# Patient Record
Sex: FEMALE | Race: WHITE | NOT HISPANIC OR LATINO | URBAN - METROPOLITAN AREA
[De-identification: names, ages, dates, MRNs, and addresses within clinical notes are randomized per-mention and may not be internally consistent; named-entity substitution may affect disease eponyms.]

---

## 2024-02-09 ENCOUNTER — OFFICE (OUTPATIENT)
Dept: URBAN - METROPOLITAN AREA CLINIC 16 | Facility: CLINIC | Age: 89
End: 2024-02-09
Payer: COMMERCIAL

## 2024-02-09 VITALS
HEART RATE: 71 BPM | HEIGHT: 60 IN | OXYGEN SATURATION: 96 % | WEIGHT: 133 LBS | SYSTOLIC BLOOD PRESSURE: 166 MMHG | DIASTOLIC BLOOD PRESSURE: 68 MMHG

## 2024-02-09 DIAGNOSIS — N18.30 CHRONIC KIDNEY DISEASE, STAGE 3 UNSPECIFIED: ICD-10-CM

## 2024-02-09 DIAGNOSIS — K52.831 COLLAGENOUS COLITIS: ICD-10-CM

## 2024-02-09 DIAGNOSIS — K21.9 GASTRO-ESOPHAGEAL REFLUX DISEASE WITHOUT ESOPHAGITIS: ICD-10-CM

## 2024-02-09 DIAGNOSIS — I49.9 CARDIAC ARRHYTHMIA, UNSPECIFIED: ICD-10-CM

## 2024-02-09 DIAGNOSIS — K58.0 IRRITABLE BOWEL SYNDROME WITH DIARRHEA: ICD-10-CM

## 2024-02-09 DIAGNOSIS — M19.90 UNSPECIFIED OSTEOARTHRITIS, UNSPECIFIED SITE: ICD-10-CM

## 2024-02-09 PROCEDURE — 99204 OFFICE O/P NEW MOD 45 MIN: CPT | Performed by: INTERNAL MEDICINE

## 2024-02-09 NOTE — SERVICEHPINOTES
Bee Watts   is seen today for a follow-up visit.     Bee was seen more than 3 years ago and is a patient of my partner Dr. Barboza for many years. And seen for urgent evaluation at this time for my partner who is on vacation. I reviewed her records from the past and when seen in April 2020 she had been noted to have a change in bowels and abdominal discomfort history and history of dyspepsia. At that time her abdominal pain had resolved and she had been managed for IBS symptoms and change in bowels with fiber supplementation. She was suggested to have a combination of GERD and IBS but improved with fiber supplementation Metamucil. In regards to reflux she had resolution of symptomatology with omeprazole in the past.She was seen with her daughter Ariela on her consultation in February 2020 and her colonoscopy in the past had been done with Dr. Ridley in 2003 and had similar symptoms with diverticulosis but negative biopsies and even remotely had a history of collagenous colitis with a workup with Dr. Walton in 1995. She had EGDs in the past requiring dilatation and a history of occasional dysphagia with solid foods remotely at that time

## 2024-02-09 NOTE — SERVICENOTES
I have outlined instructions accordingly this should include fiber supplementation #1 Citrucel or Metamucil  #2 lactose-free-avoidance of ice cream milk cheese custard etc. #3 probiotic such as align #4 use Imodium up to 4 times daily.  #5 use Pepto-Bismol 2 tablets or caplets 3 times a day.  #6 antidiarrheals such as Kaopectate is okay as well.7.  Tylenol only no aspirin or Aleve  If she has significant issues she should go to the ER and then imaging etc. may be pursued